# Patient Record
Sex: FEMALE | Race: WHITE | NOT HISPANIC OR LATINO
[De-identification: names, ages, dates, MRNs, and addresses within clinical notes are randomized per-mention and may not be internally consistent; named-entity substitution may affect disease eponyms.]

---

## 2017-01-18 ENCOUNTER — TRANSCRIPTION ENCOUNTER (OUTPATIENT)
Age: 67
End: 2017-01-18

## 2017-01-27 ENCOUNTER — TRANSCRIPTION ENCOUNTER (OUTPATIENT)
Age: 67
End: 2017-01-27

## 2017-07-15 ENCOUNTER — EMERGENCY (EMERGENCY)
Facility: HOSPITAL | Age: 67
LOS: 0 days | Discharge: HOME | End: 2017-07-15
Attending: EMERGENCY MEDICINE | Admitting: EMERGENCY MEDICINE
Payer: COMMERCIAL

## 2017-07-15 VITALS — HEIGHT: 68 IN | WEIGHT: 179.9 LBS

## 2017-07-15 VITALS
OXYGEN SATURATION: 100 % | SYSTOLIC BLOOD PRESSURE: 142 MMHG | HEART RATE: 82 BPM | RESPIRATION RATE: 16 BRPM | TEMPERATURE: 98 F | DIASTOLIC BLOOD PRESSURE: 76 MMHG

## 2017-07-15 DIAGNOSIS — S82.832A OTHER FRACTURE OF UPPER AND LOWER END OF LEFT FIBULA, INITIAL ENCOUNTER FOR CLOSED FRACTURE: ICD-10-CM
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
## 2017-07-15 DIAGNOSIS — Z86.73 PERSONAL HISTORY OF TRANSIENT ISCHEMIC ATTACK (TIA), AND CEREBRAL INFARCTION WITHOUT RESIDUAL DEFICITS: ICD-10-CM

## 2017-07-15 DIAGNOSIS — I10 ESSENTIAL (PRIMARY) HYPERTENSION: ICD-10-CM

## 2017-07-15 PROCEDURE — 99285 EMERGENCY DEPT VISIT HI MDM: CPT

## 2017-07-15 PROCEDURE — 73630 X-RAY EXAM OF FOOT: CPT | Mod: 26,50

## 2017-07-15 RX ORDER — OXYCODONE HYDROCHLORIDE 5 MG/1
5 TABLET ORAL ONCE
Qty: 0 | Refills: 0 | Status: DISCONTINUED | OUTPATIENT
Start: 2017-07-15 | End: 2017-07-15

## 2017-07-15 RX ORDER — OXYCODONE HYDROCHLORIDE 5 MG/1
1 TABLET ORAL
Qty: 20 | Refills: 0 | OUTPATIENT
Start: 2017-07-15 | End: 2017-07-20

## 2017-07-15 RX ADMIN — OXYCODONE HYDROCHLORIDE 5 MILLIGRAM(S): 5 TABLET ORAL at 15:54

## 2017-07-15 NOTE — ED ADULT NURSE NOTE - OBJECTIVE STATEMENT
Pt was removing pictures from a wall last night, slipped and fell down 5-6 steps. Pt denies head trauma, denies LOC. Pt c/o left leg pain, went to urgent care and is reported to have a positive small non-displaced tibia fx.Pt has disc of xrays.

## 2017-07-15 NOTE — ED PROVIDER NOTE - MEDICAL DECISION MAKING DETAILS
Pt with nondisplaced left proximal fibular fracture.  XR's of bilateral feet WNL.  Pt given crutches, WBAT.  F/u with orthopedics as an outpatient.  Return precautions given.

## 2017-07-15 NOTE — ED ADULT TRIAGE NOTE - CHIEF COMPLAINT QUOTE
sent in from urgent center for left lower leg fracture, pt states she fell down 6 steps yesterday because she lost footing, no LOC, did not hit head, pt states she take plavix, pt states she was told not to bare weight on left leg.

## 2017-07-15 NOTE — ED PROVIDER NOTE - PHYSICAL EXAMINATION
Musculoskeletal: Proximal left fibular TTP,  right great toe ecchymosis with TTP. Left great toe ecchymosis with TTP

## 2017-07-15 NOTE — ED PROVIDER NOTE - OBJECTIVE STATEMENT
65 Y/O F with Hx of TIA, HTN pw LLE pain s/p fall. Pt fell down 6 stairs last night. Fell onto left leg, unable to bare weight since. Pt seen at urgent care, XR found nondisplaced fibula fx. Sent to ED for further evaluation

## 2018-06-27 NOTE — ED ADULT TRIAGE NOTE - PAIN: PRESENCE, MLM
Results released to patient via [facility]. All labs are stable or at goal for her. complains of pain/discomfort